# Patient Record
Sex: MALE | Race: WHITE | HISPANIC OR LATINO | Employment: FULL TIME | URBAN - METROPOLITAN AREA
[De-identification: names, ages, dates, MRNs, and addresses within clinical notes are randomized per-mention and may not be internally consistent; named-entity substitution may affect disease eponyms.]

---

## 2017-12-06 ENCOUNTER — OFFICE VISIT (OUTPATIENT)
Dept: URGENT CARE | Facility: CLINIC | Age: 37
End: 2017-12-06
Payer: COMMERCIAL

## 2017-12-06 ENCOUNTER — HOSPITAL ENCOUNTER (OUTPATIENT)
Facility: MEDICAL CENTER | Age: 37
End: 2017-12-06
Attending: PHYSICIAN ASSISTANT
Payer: COMMERCIAL

## 2017-12-06 VITALS
WEIGHT: 148 LBS | OXYGEN SATURATION: 98 % | HEIGHT: 70 IN | SYSTOLIC BLOOD PRESSURE: 132 MMHG | HEART RATE: 68 BPM | RESPIRATION RATE: 16 BRPM | DIASTOLIC BLOOD PRESSURE: 68 MMHG | TEMPERATURE: 98.9 F | BODY MASS INDEX: 21.19 KG/M2

## 2017-12-06 DIAGNOSIS — R30.0 DYSURIA: ICD-10-CM

## 2017-12-06 PROCEDURE — 99204 OFFICE O/P NEW MOD 45 MIN: CPT | Performed by: PHYSICIAN ASSISTANT

## 2017-12-06 PROCEDURE — 87086 URINE CULTURE/COLONY COUNT: CPT

## 2017-12-06 RX ORDER — CIPROFLOXACIN 500 MG/1
500 TABLET, FILM COATED ORAL 2 TIMES DAILY
Qty: 20 TAB | Refills: 0 | Status: SHIPPED | OUTPATIENT
Start: 2017-12-06 | End: 2017-12-16

## 2017-12-06 RX ORDER — CIPROFLOXACIN 500 MG/1
500 TABLET, FILM COATED ORAL 2 TIMES DAILY
Qty: 20 TAB | Refills: 0 | Status: SHIPPED | OUTPATIENT
Start: 2017-12-06 | End: 2017-12-06 | Stop reason: SDUPTHER

## 2017-12-06 RX ORDER — LEVOTHYROXINE SODIUM 0.15 MG/1
150 TABLET ORAL
COMMUNITY

## 2017-12-06 ASSESSMENT — ENCOUNTER SYMPTOMS
FEVER: 0
COUGH: 0
FLANK PAIN: 0
CHILLS: 0

## 2017-12-07 NOTE — PROGRESS NOTES
"Subjective:      Eligio Martínez is a 37 y.o. male who presents with UTI (burning while peeing x 2-3 days)            Subjective: Patient is a 37-year-old male who comes in stating he has burning with urination the last few days. Patient states that it is very bad. Patient states he was seen at one clinic yesterday and treated for suspected gonorrhea and chlamydia. He was given Rocephin IM and Zithromax. He states that they checked his urine for gonorrhea and chlamydia but he does not have his results. He comes to our clinic today stating that he called his doctor in [place], which is where he usually resides, and his doctor thought he has a UTI. Patient reports that he is  to a male but they have an open relationship and he has multiple sex partners. He has sex with male and female. He most recently about a week ago had sex with another male. Unprotected anal sex where he was the partner inserting his penis. Patient denies any lesions on his penis or other medical problems. He denies fever, chills, nausea, vomiting abdominal pain or flank pain. He denies penile discharge     Review of Systems   Constitutional: Negative for chills and fever.   Respiratory: Negative for cough.    Cardiovascular: Negative for chest pain.   Genitourinary: Positive for dysuria, frequency and urgency. Negative for flank pain and hematuria.   Skin: Negative for itching and rash.          Objective:     /68   Pulse 68   Temp 37.2 °C (98.9 °F)   Resp 16   Ht 1.778 m (5' 10\")   Wt 67.1 kg (148 lb)   SpO2 98%   BMI 21.24 kg/m²      Physical Exam       HEENT is unremarkable  Cardiac S1: Regular rate and rhythm  Lungs are clear to auscultation bilaterally  Abdomen soft nontender nondistended  Penis examination: He has descended testicles that are nontender to palpation. He is circumcised. He has no lesions, ulcers or papules on his penis. He has no discharge from the urethral meatus  Skin is otherwise " well perfused    Urgent care course urinalysis was done and was completely unremarkable. Urine culture pending     Assessment/Plan:     1. Dysuria  Patient at risk for STDs and/or UTI given his recent history of unprotected anal sex. Did discuss at length my recommendation for his to use condoms. As stated patient was seen at a clinic yesterday and treated with Rocephin and Zithromax for gonorrhea and Chlamydia. Therefore I will not resend his urine for gonorrhea and chlamydia testing. However I will treat for enteric pathogens with Cipro as he still should be covered for Escherichia coli infection  If symptoms persist or worsen despite treatment please go to the emergency room    - URINE CULTURE(NEW); Future  - ciprofloxacin (CIPRO) 500 MG Tab; Take 1 Tab by mouth 2 times a day for 10 days.  Dispense: 20 Tab; Refill: 0

## 2017-12-09 LAB
BACTERIA UR CULT: NORMAL
SIGNIFICANT IND 70042: NORMAL
SOURCE SOURCE: NORMAL